# Patient Record
Sex: MALE | Race: WHITE | NOT HISPANIC OR LATINO | Employment: FULL TIME | URBAN - METROPOLITAN AREA
[De-identification: names, ages, dates, MRNs, and addresses within clinical notes are randomized per-mention and may not be internally consistent; named-entity substitution may affect disease eponyms.]

---

## 2017-03-07 ENCOUNTER — OFFICE VISIT - HEALTHEAST (OUTPATIENT)
Dept: FAMILY MEDICINE | Facility: CLINIC | Age: 32
End: 2017-03-07

## 2017-03-07 DIAGNOSIS — F17.200 TOBACCO USE DISORDER: ICD-10-CM

## 2017-03-07 DIAGNOSIS — Z71.85 IMMUNIZATION COUNSELING: ICD-10-CM

## 2017-04-28 ENCOUNTER — COMMUNICATION - HEALTHEAST (OUTPATIENT)
Dept: HEALTH INFORMATION MANAGEMENT | Facility: CLINIC | Age: 32
End: 2017-04-28

## 2019-09-25 ENCOUNTER — OFFICE VISIT - HEALTHEAST (OUTPATIENT)
Dept: FAMILY MEDICINE | Facility: CLINIC | Age: 34
End: 2019-09-25

## 2019-09-25 DIAGNOSIS — M54.16 LUMBAR BACK PAIN WITH RADICULOPATHY AFFECTING RIGHT LOWER EXTREMITY: ICD-10-CM

## 2019-09-25 ASSESSMENT — MIFFLIN-ST. JEOR: SCORE: 2028.51

## 2019-09-26 ENCOUNTER — HOSPITAL ENCOUNTER (OUTPATIENT)
Dept: PHYSICAL MEDICINE AND REHAB | Facility: CLINIC | Age: 34
Discharge: HOME | End: 2019-09-26
Attending: NURSE PRACTITIONER

## 2019-09-26 DIAGNOSIS — M54.41 ACUTE BILATERAL LOW BACK PAIN WITH RIGHT-SIDED SCIATICA: ICD-10-CM
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
## 2019-09-26 DIAGNOSIS — R20.2 PARESTHESIA OF RIGHT LOWER EXTREMITY: ICD-10-CM

## 2019-09-26 RX ORDER — GABAPENTIN 300 MG/1
300 CAPSULE ORAL 3 TIMES DAILY
Qty: 90 CAPSULE | Refills: 3 | Status: SHIPPED | OUTPATIENT
Start: 2019-09-26 | End: 2021-10-13

## 2019-09-26 ASSESSMENT — MIFFLIN-ST. JEOR: SCORE: 2027.15

## 2019-10-09 ENCOUNTER — OFFICE VISIT - HEALTHEAST (OUTPATIENT)
Dept: PHYSICAL THERAPY | Facility: REHABILITATION | Age: 34
End: 2019-10-09

## 2019-10-09 DIAGNOSIS — M54.16 LUMBAR RADICULITIS: ICD-10-CM

## 2019-10-09 DIAGNOSIS — M54.50 ACUTE BILATERAL LOW BACK PAIN WITHOUT SCIATICA: ICD-10-CM

## 2019-10-09 DIAGNOSIS — M62.81 MUSCLE WEAKNESS (GENERALIZED): ICD-10-CM

## 2019-10-22 ENCOUNTER — OFFICE VISIT - HEALTHEAST (OUTPATIENT)
Dept: PHYSICAL THERAPY | Facility: REHABILITATION | Age: 34
End: 2019-10-22

## 2019-10-22 DIAGNOSIS — M54.50 ACUTE BILATERAL LOW BACK PAIN WITHOUT SCIATICA: ICD-10-CM

## 2019-10-22 DIAGNOSIS — M62.81 MUSCLE WEAKNESS (GENERALIZED): ICD-10-CM

## 2019-10-22 DIAGNOSIS — M54.16 LUMBAR RADICULITIS: ICD-10-CM

## 2019-10-24 ENCOUNTER — OFFICE VISIT - HEALTHEAST (OUTPATIENT)
Dept: PHYSICAL THERAPY | Facility: REHABILITATION | Age: 34
End: 2019-10-24

## 2019-10-24 DIAGNOSIS — M62.81 MUSCLE WEAKNESS (GENERALIZED): ICD-10-CM

## 2019-10-24 DIAGNOSIS — M54.50 ACUTE BILATERAL LOW BACK PAIN WITHOUT SCIATICA: ICD-10-CM

## 2019-10-24 DIAGNOSIS — M54.16 LUMBAR RADICULITIS: ICD-10-CM

## 2019-11-05 ENCOUNTER — COMMUNICATION - HEALTHEAST (OUTPATIENT)
Dept: PHYSICAL THERAPY | Facility: REHABILITATION | Age: 34
End: 2019-11-05

## 2019-11-07 ENCOUNTER — OFFICE VISIT - HEALTHEAST (OUTPATIENT)
Dept: PHYSICAL THERAPY | Facility: REHABILITATION | Age: 34
End: 2019-11-07

## 2019-11-07 DIAGNOSIS — M54.16 LUMBAR RADICULITIS: ICD-10-CM

## 2019-11-07 DIAGNOSIS — M62.81 MUSCLE WEAKNESS (GENERALIZED): ICD-10-CM

## 2019-11-07 DIAGNOSIS — M54.50 ACUTE BILATERAL LOW BACK PAIN WITHOUT SCIATICA: ICD-10-CM

## 2019-11-14 ENCOUNTER — OFFICE VISIT - HEALTHEAST (OUTPATIENT)
Dept: PHYSICAL THERAPY | Facility: REHABILITATION | Age: 34
End: 2019-11-14

## 2019-11-14 DIAGNOSIS — M54.50 ACUTE BILATERAL LOW BACK PAIN WITHOUT SCIATICA: ICD-10-CM

## 2019-11-14 DIAGNOSIS — M62.81 MUSCLE WEAKNESS (GENERALIZED): ICD-10-CM

## 2019-11-14 DIAGNOSIS — M54.16 LUMBAR RADICULITIS: ICD-10-CM

## 2020-09-22 ENCOUNTER — AMBULATORY - HEALTHEAST (OUTPATIENT)
Dept: FAMILY MEDICINE | Facility: CLINIC | Age: 35
End: 2020-09-22

## 2020-09-22 ENCOUNTER — VIRTUAL VISIT (OUTPATIENT)
Dept: FAMILY MEDICINE | Facility: OTHER | Age: 35
End: 2020-09-22
Payer: COMMERCIAL

## 2020-09-22 DIAGNOSIS — Z20.822 SUSPECTED COVID-19 VIRUS INFECTION: ICD-10-CM

## 2020-09-22 PROCEDURE — 99421 OL DIG E/M SVC 5-10 MIN: CPT | Performed by: PHYSICIAN ASSISTANT

## 2020-09-22 NOTE — PROGRESS NOTES
"Date: 2020 12:36:48  Clinician: Ge Duncan  Clinician NPI: [id number]  Patient: Jose Manuel Santacruz  Patient : 1985  Patient Address: [facility] [address] [place] [id number]  Patient Phone: [phone]  Visit Protocol: URI  Patient Summary:  Jose Manuel is a 35 year old ( : 1985 ) male who initiated a [facility] Visit for COVID-19 (Coronavirus) evaluation and screening. When asked the question \"Please sign me up to receive news, health information and promotions from [facility].\", Jose Manuel responded \"No\".    Jose Manuel states his symptoms started gradually 3-4 days ago. After his symptoms started, they improved and then got worse again.   His symptoms consist of malaise, facial pain or pressure, a sore throat, diarrhea, a cough, nasal congestion, a headache, and myalgia. Jose Manuel also feels feverish.   Symptom details     Nasal secretions: The color of his mucus is yellow.    Cough: Jose Manuel coughs a few times an hour and his cough is not more bothersome at night. Phlegm comes into his throat when he coughs. He believes his cough is caused by post-nasal drip. The color of the phlegm is clear.     Sore throat: Jose Manuel reports having mild throat pain (1-3 on a 10 point pain scale), does not have exudate on his tonsils, and can swallow liquids. He is not sure if the lymph nodes in his neck are enlarged. A rash has not appeared on the skin since the sore throat started.     Temperature: His current temperature is 98.9 degrees Fahrenheit.     Facial pain or pressure: The facial pain or pressure does not feel worse when bending or leaning forward.     Headache: He states the headache is moderate (4-6 on a 10 point pain scale).      Jose Manuel denies having chills, teeth pain, ageusia, ear pain, anosmia, vomiting, rhinitis, nausea, and wheezing. He also denies taking antibiotic medication in the past month and having recent facial or sinus surgery in the past 60 days. He is not experiencing dyspnea.   Precipitating events  Jose Manuel is " not sure if he has been exposed to someone with strep throat. He has not recently been exposed to someone with influenza. Jose Manuel has been in close contact with the following high risk individuals: children under the age of 5 and adults 65 or older.   Pertinent COVID-19 (Coronavirus) information  In the past 14 days, Jose Manuel has not worked in a congregate living setting.   He does not work or volunteer as healthcare worker or a  and does not work or volunteer in a healthcare facility.   Jose Manuel also has not lived in a congregate living setting in the past 14 days. He does not live with a healthcare worker.   Jose Manuel has not had a close contact with a laboratory-confirmed COVID-19 patient within 14 days of symptom onset.   Since December 2019, Jose Manuel and has not had upper respiratory infection or influenza-like illness. Has not been diagnosed with lab-confirmed COVID-19 test   Pertinent medical history  Jose Manuel had 1 sinus infection within the past year.   Jose Manuel needs a return to work/school note.   Weight: 215 lbs   Jose Manuel does not smoke or use smokeless tobacco.   Weight: 215 lbs    MEDICATIONS: ibuprofen oral, ALLERGIES: Penicillins  Clinician Response:  Dear Jose Manuel,   Your symptoms show that you may have coronavirus (COVID-19). This illness can cause fever, cough and trouble breathing. Many people get a mild case and get better on their own. Some people can get very sick.  What should I do?  We would like to test you for this virus.   1. Please call [phone] to schedule your visit. Explain that you were referred by [facility] to have a COVID-19 test. Be ready to share your [facility] visit ID number.  The following will serve as your written order for this COVID Test, ordered by me, for the indication of suspected COVID [Z20.828]: The test will be ordered in [employer], our electronic health record, after you are scheduled. It will show as ordered and authorized by Mikhail Lewis MD.  Order: COVID-19 (Coronavirus)  "PCR for SYMPTOMATIC testing from [facility].      2. When it's time for your COVID test:  Stay at least 6 feet away from others. (If someone will drive you to your test, stay in the backseat, as far away from the  as you can.)   Cover your mouth and nose with a mask, tissue or washcloth.  Go straight to the testing site. Don't make any stops on the way there or back.      3.Starting now: Stay home and away from others (self-isolate) until:   You've had no fever---and no medicine that reduces fever---for one full day (24 hours). And...   Your other symptoms have gotten better. For example, your cough or breathing has improved. And...   At least 10 days have passed since your symptoms started.       During this time, don't leave the house except for testing or medical care.   Stay in your own room, even for meals. Use your own bathroom if you can.   Stay away from others in your home. No hugging, kissing or shaking hands. No visitors.  Don't go to work, school or anywhere else.    Clean \"high touch\" surfaces often (doorknobs, counters, handles, etc.). Use a household cleaning spray or wipes. You'll find a full list of  on the EPA website: www.epa.gov/pesticide-registration/list-n-disinfectants-use-against-sars-cov-2.   Cover your mouth and nose with a mask, tissue or washcloth to avoid spreading germs.  Wash your hands and face often. Use soap and water.  Caregivers in these groups are at risk for severe illness due to COVID-19:  o People 65 years and older  o People who live in a nursing home or long-term care facility  o People with chronic disease (lung, heart, cancer, diabetes, kidney, liver, immunologic)  o People who have a weakened immune system, including those who:   Are in cancer treatment  Take medicine that weakens the immune system, such as corticosteroids  Had a bone marrow or organ transplant  Have an immune deficiency  Have poorly controlled HIV or AIDS  Are obese (body mass index of 40 or " higher)  Smoke regularly   o Caregivers should wear gloves while washing dishes, handling laundry and cleaning bedrooms and bathrooms.  o Use caution when washing and drying laundry: Don't shake dirty laundry, and use the warmest water setting that you can.  o For more tips, go to www.cdc.gov/coronavirus/2019-ncov/downloads/10Things.pdf.    How can I take care of myself?    Get lots of rest. Drink extra fluids (unless a doctor has told you not to).   Take Tylenol (acetaminophen) for fever or pain. If you have liver or kidney problems, ask your family doctor if it's okay to take Tylenol.   Adults can take either:    650 mg (two 325 mg pills) every 4 to 6 hours, or...   1,000 mg (two 500 mg pills) every 8 hours as needed.    Note: Don't take more than 3,000 mg in one day. Acetaminophen is found in many medicines (both prescribed and over-the-counter medicines). Read all labels to be sure you don't take too much.   For children, check the Tylenol bottle for the right dose. The dose is based on the child's age or weight.    If you have other health problems (like cancer, heart failure, an organ transplant or severe kidney disease): Call your specialty clinic if you don't feel better in the next 2 days.       Know when to call 911. Emergency warning signs include:    Trouble breathing or shortness of breath Pain or pressure in the chest that doesn't go away Feeling confused like you haven't felt before, or not being able to wake up Bluish-colored lips or face.  Where can I get more information?    [employer] -- About COVID-19: [email or web address]   CDC -- What to Do If You're Sick: www.cdc.gov/coronavirus/2019-ncov/about/steps-when-sick.html   CDC -- Ending Home Isolation: www.cdc.gov/coronavirus/2019-ncov/hcp/disposition-in-home-patients.html   CDC -- Caring for Someone: www.cdc.gov/coronavirus/2019-ncov/if-you-are-sick/care-for-someone.html   [facility] -- Interim Guidance for Hospital Discharge to Home:  www.health.[facility].mn.us/diseases/coronavirus/hcp/hospdischarge.pdf   [facility] clinical trials (COVID-19 research studies): [email or web address]/umn-[email or web address]-trials    Below are the COVID-19 hotlines at the [facility] ([facility]). Interpreters are available.    For health questions: Call 663-568-2959 or 1-189.912.3894 (7 a.m. to 7 p.m.) For questions about schools and childcare: Call 225-955-0575 or 1-664.669.6472 (7 a.m. to 7 p.m.)    Diagnosis: Cough  Diagnosis ICD: R05

## 2020-09-24 ENCOUNTER — COMMUNICATION - HEALTHEAST (OUTPATIENT)
Dept: SCHEDULING | Facility: CLINIC | Age: 35
End: 2020-09-24

## 2021-05-30 VITALS — WEIGHT: 217 LBS | BODY MASS INDEX: 27.12 KG/M2

## 2021-06-01 NOTE — PATIENT INSTRUCTIONS - HE
[facility] Radiology Locations    Please call [phone] to schedule your image(s) (select option #1 and then #2). There are 3 different locations, see below. You can do walk-in visits for xray only images if you want.     [facility]  [address] [address]    [facility]   [address] [address]    [facility]  [address] [address]    Make sure you have your work comp claim information prior to doing your xray.     Discussed the importance of core strengthening, ROM, stretching exercises with the patient and how each of these entities is important in decreasing pain.  Explained to the patient that the purpose of physical therapy is to teach the patient a home exercise program.  These exercises need to be performed every day in order to decrease pain and prevent future occurrences of pain.        ~Please call Nurse Navigation line [phone] with any questions or concerns about your treatment plan, if symptoms worsen and you would like to be seen urgently, or if you have problems controlling bladder and bowel function.  ~Follow Up Appointment time slots with Heavenly Barrett, CNP with the [facility], are also available at the [facility] location near [facility] on the first and third THURSDAY afternoons of each month.    Prescribed Gabapentin today, 300 mg tablets, to be titrated up to 3 tablets 3 times a day as tolerated for your nerve pain. Please follow Gabapentin dosing chart below.  Gabapentin 300mg Dosing Chart  DATE  MORNING AFTERNOON BEDTIME    Day 1 0 0 1    Day 2 0 0 1    Day 3 0 0 1    Day 4 1 0 1    Day 5 1 0 1    Day 6 1 0 1    Day 7 1 1 1    Day 8 1 1 1    Day 9 1 1 1    Day 10 1 1 2    Day 11 1 1 2    Day 12 1 1 2    Day 13 2 1 2    Day 14 2 1 2    Day 15 2 1 2    Day 16 2 2 2    Day 17 2 2 2    Day 18 2 2 2    Day 19 2 2 3    Day 20 2 2 3    Day 21 2 2 3    Day 22 3 2 3    Day 23 3 2 3    Day 24 3  2 3    Day 25 3 3 3    Day 26 3 3 3    Day 27 3 3 3     Continue medication, taking 3 capsules three times daily  Please call if you have any questions regarding how to take your medication

## 2021-06-01 NOTE — PROGRESS NOTES
ASSESSMENT: Jose Manuel Santacruz is a 34 y.o. male who presents for consultation at the request of HE PCP Steven Wallace MD, with a past medical history significant for joint pain, infertility, hernia surgery who presents today for new patient evaluation of:    -Acute bilateral low back pain right greater than left lumbosacral junction with paresthesias/tightness into the right buttock/posterior thigh with mild paresthesias into the right groin.  Patient does have increased pain with forward flexion as well as positive straight leg raise on exam on the right to back and leg symptoms consistent with radiculitis likely related to disc bulge from work twisting/pulling injury 8/29/2019.    Patient is neurologically intact on exam. No myelopathic or red flag symptoms.     WALTER Score: 30    WHO 5: 16     Diagnoses and all orders for this visit:    Acute bilateral low back pain with right-sided sciatica  -     XR Lumbar Spine 2 or 3 VWS; Future; Expected date: 09/26/2019  -     Ambulatory referral to PT/OT  -     gabapentin (NEURONTIN) 300 MG capsule; Take 1 capsule (300 mg total) by mouth 3 (three) times a day. Follow Gabapentin Dosing chart given  Dispense: 90 capsule; Refill: 3    Paresthesia of right lower extremity  -     XR Lumbar Spine 2 or 3 VWS; Future; Expected date: 09/26/2019  -     Ambulatory referral to PT/OT  -     gabapentin (NEURONTIN) 300 MG capsule; Take 1 capsule (300 mg total) by mouth 3 (three) times a day. Follow Gabapentin Dosing chart given  Dispense: 90 capsule; Refill: 3    Work related injury  -     XR Lumbar Spine 2 or 3 VWS; Future; Expected date: 09/26/2019  -     Ambulatory referral to PT/OT      PLAN:  Reviewed spine anatomy and disease process. Discussed diagnosis and treatment options with the patient today. A shared decision making model was used.  The patient's values and choices were respected. The following represents what was discussed and decided upon by the provider and the patient.       -DIAGNOSTIC TESTS:    --Ordered lumbar spine x-ray to further evaluate.  --Discussed with patient that if he does not get relief with PT and pending x-ray we may consider lumbar spine MRI however he is neurologically intact on exam today therefore will hold off on advanced imaging.    -PHYSICAL THERAPY: Referral to physical therapy placed to [facility] as well to establish home exercises for core strengthening and nerve glide exercises.  Discussed the importance of core strengthening, ROM, stretching exercises with the patient and how each of these entities is important in decreasing pain.  Explained to the patient that the purpose of physical therapy is to teach the patient a home exercise program.  These exercises need to be performed every day in order to decrease pain and prevent future occurrences of pain.        -MEDICATIONS: Prescribed gabapentin 300 mg to titrate up to 1 tablet 3 times daily, discussed with patient if he gets no benefit tolerates this dose we could go to a higher dose as well up to 3 tablets 3 times daily.  -I did advise patient to continue and complete Medrol Dosepak.  Discussed multiple medication options today with patient. Discussed risks, side effects, and proper use of medications. Patient verbalized understanding.    -Workability: Did provide patient with work restrictions today 10 pound lifting limit, limiting bending and twisting for 3 weeks.  Discussed with patient if he is doing well at that time we can lift restrictions or light in his restrictions.  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    -INTERVENTIONS: No recommendations for injections at this time, if symptoms are not improving with PT pending MRI review injections could be considered.  Discussed risks and benefits of injections with patient today.    -PATIENT EDUCATION:  45 minutes of total visit time was spent face to face with the patient today,  greater than 50% of total time spent with patient was spent on counseling, education, and coordinating care.   -10 minutes spent outside of visit time, non-face-to-face time, reviewing chart.    -FOLLOW-UP:   Follow-up if symptoms are not improving with physical therapy 3 to 4 weeks or sooner pain is worsening or new symptoms arise.    Advised patient to call the [facility] if symptoms worsen or you have problems controlling bladder and bowel function.   ______________________________________________________________________    SUBJECTIVE:  HPI:  Jose Manuel Santacruz  Is a 34 y.o. male who presents today for new patient evaluation of low back pain low back pain at the lumbosacral junction that is predominant on the right with numbness and tingling and tightness that radiates into the right posterior buttock posterior thigh and mild symptoms into the right anterior groin that is been ongoing since work injury 8/29/2019, somewhat intermittent however more significant and constant since 9/24/2019 where patient did try to work again but did not tolerate working and his pain flared.  Currently he does report that his pain is most bothersome with sitting for long periods of time but also any type of bending or twisting movements even bending down to put his socks on has been challenging.  He does report that the first step after sitting for some time is also slight shaky but he denies any lower extremity weakness.  Patient otherwise denies any lower extremity pain, denies weakness lower extremities, denies recent trips or falls, denies balance changes, denies bowel or bladder loss control, denies saddle anesthesia.    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  No prior history of ongoing back issues.    -Treatment to Date:  No prior spinal surgery or spinal injection.  No prior physical therapy treatments.  Chiropractic treatments x3 with short-term relief.    -Medications:  Medrol Dosepak prescribed 9/25/2019, slight improvement so far.  BenGay topical with minimal benefit  Ibuprofen with minimal benefit.    Current Outpatient Medications on File Prior to Encounter   Medication Sig Dispense Refill     MENTHOL (BENGAY TOP) Ultra Strength 4-10-30 % External Cream       predniSONE (DELTASONE) 20 MG tablet Take 20 mg by mouth 2 (two) times a day for 5 days. 10 tablet 0     ibuprofen 200 mg cap Take 2 capsules by mouth every 6 (six) hours as needed.       No current facility-administered medications on file prior to encounter.        Allergies   Allergen Reactions     Penicillins        No past medical history on file.     Patient Active Problem List   Diagnosis     Joint Pain, Localized In The Right Shoulder     Infertility       Past Surgical History:   Procedure Laterality Date     HERNIA REPAIR      R and L inguinal       Family History   Problem Relation Age of Onset     No Medical Problems Mother      No Medical Problems Father        Reviewed past medical, surgical, and family history with patient found on new patient intake packet located in EMR Media tab.     SOCIAL HX: Patient works as a  at a sign face production company.  Patient is .  Patient denies smoking/tobacco use, does drink 1 beer every couple of weeks however denies history being a heavy drinker, denies recreational drug use.    ROS: Positive for muscle pain, muscle fatigue.  Specifically negative for bowel/bladder dysfunction, balance changes, headache, dizziness, foot drop, fevers, chills, appetite changes, nausea/vomiting, unexplained weight loss. Otherwise 13 systems reviewed are negative. Please see the patient's intake questionnaire from today for details.    OBJECTIVE:  /82 (Patient Site: Right Arm, Patient Position: Sitting)    "Ht 6' 3\" (1.905 m)   Wt (!) 223 lb (101.2 kg)   BMI 27.87 kg/m      PHYSICAL EXAMINATION:    --CONSTITUTIONAL:  Vital signs as above.  No acute distress.  The patient is well nourished and well groomed.  --PSYCHIATRIC:  Appropriate mood and affect. The patient is awake, alert, oriented to person, place, time and answering questions appropriately with clear speech.    --SKIN:  Skin over the face, bilateral lower extremities, and posterior torso is clean, dry, intact without rashes.    --RESPIRATORY: Normal rhythm and effort. No abnormal accessory muscle breathing patterns noted.   --ABDOMINAL:  Soft, non-distended, non-tender throughout all quadrants.   --STANDING EXAMINATION:  Normal lumbar lordosis noted, no lateral shift.  --MUSCULOSKELETAL: Lumbar spine inspection reveals no evidence of deformity. Range of motion is not limited in lumbar extension, lateral rotation, limitations with forward flexion due to pain. No tenderness to palpation lumbar spine. Straight leg raising in the seated position is negative to radicular pain on the left, positive to right low back pain and right posterior thigh discomfort on the right. Sciatic notch non-tender.  --SACROILIAC JOINT: One Finger point test negative.  --GROSS MOTOR: Gait is non-antalgic. Easily arises from a seated position.   --LOWER EXTREMITY MOTOR TESTING:  Plantar flexion left 5/5, right 5/5   Dorsiflexion left 5/5, right 5/5   Great toe MTP extension left 5/5, right 5/5  Knee flexion left 5/5, right 5/5  Knee extension left 5/5, right 5/5   Hip flexion left 5/5, right 5/5  Hip abduction left 5/5, right 5/5  Hip adduction left 5/5, right 5/5   --HIPS: Full range of motion bilaterally. Negative FABERs on the involved lower extremity.   --NEUROLOGICAL:  1/4 patellar, medial hamstring, and achilles reflexes bilaterally.  Sensation to light touch is intact in the bilateral L4, L5, and S1 dermatomes. Babinski is negative. No clonus.  Negative Shyann reflex " bilaterally.  --VASCULAR:  2/4 dorsalis pedis and posterior tibialsi pulses bilaterally.  Bilateral lower extremities are warm.  There is no pitting edema of the bilateral lower extremities.    RESULTS: Prior medical records from [facility] 3/7/2017 to current and care everywhere were reviewed today.    Imaging: No results found.

## 2021-06-01 NOTE — PROGRESS NOTES
Assessment/Plan:    1. Lumbar back pain with radiculopathy affecting right lower extremity  We discussed obtaining spine x-ray today but patient declines.  I recommended physical therapy.  Referral placed today.  Also provide short course of prednisone, 20 mg twice daily for 5 days.  Will place referral to spine care in the event of any persisting or worsening symptoms despite physical therapy.  We discussed red flag symptoms of back pain that would warrant immediate evaluation.  He is comfortable with this plan.  - predniSONE (DELTASONE) 20 MG tablet; Take 20 mg by mouth 2 (two) times a day for 5 days.  Dispense: 10 tablet; Refill: 0  - Ambulatory referral to PT/OT  - Ambulatory referral to Spine Care      Subjective:    Jose Manuel Santacruz is a 34-year-old male seen today for evaluation of lower back pain.  Patient states that pain started about a month ago.  He recalls injuring it at work.  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  Does a lot of heavy lifting and pulling for his job.  He recalls moving a large steel pole, using a twisting motion.  Pain started shortly after that.  This was on August 29, 2019.  Pain is intermittent but seems to be happening more frequently lately.  Some days are better than others.  He finds the pain is manageable with over-the-counter medications at times and other days he has some difficulty with bending and walking.  Getting up from a seated position is more difficult and exacerbates the pain.  He has been using ibuprofen and BenGay which helps take the edge off.  Yesterday, symptoms became more bothersome.  He occasionally gets a tingly sensation down his right buttocks.  He has seen a chiropractor 3 times since the injury.  Has had adjustments which helped in the short-term.  He has another chiropractic appointment tomorrow.  He is wondering about possible next steps from here.  Denies any history of back injuries or surgeries prior to this.  No loss of bowel or bladder control. Review  "of systems is as stated in HPI, and the remainder of the 10 system review is otherwise unremarkable.    Past Medical History, Family History, and Social History reviewed.    Past Surgical History:   Procedure Laterality Date     HERNIA REPAIR      R and L inguinal        Family History   Problem Relation Age of Onset     No Medical Problems Mother      No Medical Problems Father         History reviewed. No pertinent past medical history.     Social History     Tobacco Use     Smoking status: Current Every Day Smoker     Packs/day: 0.50     Smokeless tobacco: Never Used   Substance Use Topics     Alcohol use: Yes     Drug use: No        Current Outpatient Medications   Medication Sig Dispense Refill     ibuprofen 200 mg cap Take 2 capsules by mouth every 6 (six) hours as needed.       MENTHOL (BENGAY TOP) Ultra Strength 4-10-30 % External Cream       buPROPion (WELLBUTRIN SR) 150 MG 12 hr tablet Take 1 tablet (150 mg total) by mouth 2 (two) times a day. 60 tablet 2     predniSONE (DELTASONE) 20 MG tablet Take 20 mg by mouth 2 (two) times a day for 5 days. 10 tablet 0     No current facility-administered medications for this visit.           Objective:    Vitals:    09/25/19 0811   BP: 120/80   Patient Site: Right Arm   Patient Position: Sitting   Cuff Size: Adult Regular   Pulse: 66   Weight: (!) 223 lb 4.8 oz (101.3 kg)   Height: 6' 3\" (1.905 m)      Body mass index is 27.91 kg/m .      General Appearance:    Alert, cooperative, no distress, appears stated age.     Back:     Symmetric, no curvature, ROM normal, no CVA tenderness, no point tenderness noted along spine.  Positive straight leg raise while seated.   Lungs:     Clear to auscultation bilaterally, respirations unlabored   Heart:    Regular rate and rhythm, S1 and S2 normal, no murmur, rub   or gallop   Extremities:   Extremities normal, atraumatic, no cyanosis or edema   Pulses:   2+ and symmetric all extremities   Skin:   Skin color, texture, turgor " normal, no rashes or lesions   Neurologic:   CNII-XII intact. Normal strength, sensation and reflexes       throughout       This note has been dictated using voice recognition software. Any grammatical or context distortions are unintentional and inherent to the use of this software.

## 2021-06-02 NOTE — PROGRESS NOTES
[facility]   [facility] Initial Evaluation    Patient Name: Jose Manuel Santacruz  Date of evaluation: 10/9/2019  Referral Diagnosis: Lumbar back pain with radiculopathy affecting right lower extremity  Referring provider: Renetta Alston CNP  Visit Diagnosis:     ICD-10-CM    1. Acute bilateral low back pain without sciatica M54.5    2. Lumbar radiculitis M54.16    3. Muscle weakness (generalized) M62.81        Assessment:      Pt presents with acute bilateral low back pain with minimal lumbar radiculitis on the R and L after performing a lifting and twisting task at work.  Pt has limited lumbar flexion>extension with increased pain and mild radicular sx.  Pt has decreased core and lumbar strength.  Pt with hamstring tightness, mild + slump B and decreased posture and mobility awareness.  Pt will benefit from skilled PT to improve overall body and lifting mechanics and decrease is low back pain and sx.     Pt. is appropriate for skilled PT intervention as outlined in the Plan of Care (POC).    Goals:  Pt. will demonstrate/verbalize independence in self-management of condition in : 6 weeks    Patient will decrease : WALTER score;for improved quality of function;in 6 weeks  Pt will: be able to perform dressing tasks for his socks, pants, etc with increased ease and pain <2/10; in 6 weeks   Pt will: be able to perform work tasks with increased body mechanics and LBP <2/10; in 6 weeks       Patient's expectations/goals are realistic.    Barriers to Learning or Achieving Goals:  No Barriers.       Plan / Patient Instructions:        Plan of Care:   Authorization / Certification Number of Visits: No visit limit per ICT  Communication with: Referral Source;Patient Caregiver;Employer  Patient Related Instruction: Nature of Condition;Treatment plan and rationale;Self Care instruction;Basis of treatment;Body mechanics;Posture;Expected outcome  Times per Week: 2  Number of Weeks: 6  Number of Visits: 12 - PRN   Discharge  Planning: Pt will be discharged upon meeting goals or plateau of progress   Therapeutic Exercise: ROM;Stretching;Strengthening  Neuromuscular Reeducation: posture;kinesio tape;core  Manual Therapy: soft tissue mobilization;myofascial release;joint mobilization;muscle energy;strain counterstrain  Modalities: electrical stimulation;TENS;traction;cold pack;hot pack      Plan for next visit: Focus on lumbar mobility, core and lumbar strengthening with ther-ex and continue with estim or MT PRN      Subjective:         History of Present Illness:    Jose Manuel is a 34 y.o. male who presents to therapy today with complaints of low back pain. Date of onset/duration of symptoms is 2019. Onset was sudden. Symptoms are not improving. He denies history of similar symptoms. He describes their previous level of function as not limited    Pt was doing a pulling/twisting motion for work tasks and he did not feel pain immediately but shortly after he started feeling pain in any position he moved in.  This was to move a large roll of product that was heavy and onto a pull.  Pain persisted for several days and then he mentioned it to his employer.     Over the month, he was seeing a chiropractor for treatment.     Then 19 woke up with intense pain.  Perhaps did too much lifting the day before at work.     Pt has not had imaging.  He has done a prednisone 5 days treatment and that helped some.  He is also taking gabapentin with 2 pills in the AM and 1 in the PM.   He does not like taking gabapentin.     Pt has looked online for PT stretches. - self traction - but he doesn't have anything to hang on to. Prone extension, wall shifts     Pt reports that his sx are pain across the low back with some tightness and tingling wrapping into the right hip and down to the back of the thigh and then minimal L hip pain.  Pt also reports that in the past week he has also had some more pain in his thoracolumbar muscles.     Pain Ratin  Pain  rating at best: 4  Pain rating at worst: 9  Pain description: aching, pain, sharp and tingling    Functional limitations are described as occurring with:   bending  personal cares dressing lower body and donning shoes and socks  performing routine daily activities  sitting too long   work tasks including bending, twisting, lifting, etc.     Patient reports benefit from:  anti-inflammatory, cold, CBD oil          Objective:      Note: Items left blank indicates the item was not performed or not indicated at the time of the evaluation.    Patient Outcome Measures :    Modified Oswestry Low Back Pain Disablity Questionnaire  in %: 38     Scores range from 0-100%, where a score of 0% represents minimal pain and maximal function. The minimal clinically important difference is a score reduction of 12%.    Examination  1. Acute bilateral low back pain without sciatica     2. Lumbar radiculitis     3. Muscle weakness (generalized)       Involved side: Right and Bilateral  Posture Observation:      General sitting posture is  fair.    Lumbar ROM:    Date: 10/9/19      *Indicate scale AROM AROM AROM   Lumbar Flexion Mod loss, LBP, gowers sign to get up     Lumbar Extension Min loss, tight, feels ok      Right Left Right Left Right Left   Lumbar Sidebending No loss, no p No loss, no        Lumbar Rotation No loss, no pain  No loss, no pain        Thoracic Flexion      Thoracic Extension      Thoracic Sidebending         Thoracic Rotation           Lower Extremity Strength:     Date: 10/9/19     LE strength/5 Right Left Right Left Right Left   Hip Flexion (L1-3) 5 5       Hip Extension (L5-S1) 4 4       Hip Abduction (L4-5) 4+ 4+       Hip Adduction (L2-3) 5 5       Hip External Rotation 5 5       Hip Internal Rotation 5 5       Knee Extension (L3-4) 5 5       Knee Flexion 5 5       Ankle Dorsiflexion (L4-5) 5 5       Great Toe Extension (L5)         Ankle Plantar flexion (S1)         Abdominals Decreased strength and awareness         Sensation: WNL        Reflex Testing: NT    Palpation:  Increased tenderness over:  L3, L4,.L5  Lumbar paraspinals = min-mod     Lumbar Special Tests:     Lumbar Special Tests Right Left SI Tests Right  Left   Quadrant test   SI Compression - -   Straight leg raise -  H/S tight  Min +  H/S tight  SI Distraction - -   Crossover response - - POSH Test - -   Slump + + Sacral Thrust - -   Sit-up test  FADIR - -   Trunk extensor endurance test  Resisted Abduction     Prone instability test  Other:     Pubic shotgun  Other:       Repeated Motion Testing:  Peripheralizes with Flexion    Passive Mobility - Joint Integrity:  WNL   Pain over L3-L5     LE Screen:  bilateral hamstring tightness     Treatment Today     TREATMENT MINUTES COMMENTS   Evaluation 25    Self-care/ Home management     Manual therapy     Neuromuscular Re-education     Therapeutic Activity     Therapeutic Exercises 20 Pathology, POC, HEP, etc.   See flow sheet    Gait training     Modality: estim  10  (13) Bilateral lumbar IFC - high/sweep - supine 90/90               Total 58    Blank areas are intentional and mean the treatment did not include these items.     PT Evaluation Code: (Please list factors)  Patient History/Comorbidities:   Patient Active Problem List   Diagnosis     Joint Pain, Localized In The Right Shoulder     Infertility     Examination: pain with lumbar ROM, core and lumbar weakness, tender over lumbar spine and muscles, poor posture  Clinical Presentation: stable and uncomplicated   Clinical Decision Making: low    Patient History/  Comorbidities Examination  (body structures and functions, activity limitations, and/or participation restrictions) Clinical Presentation Clinical Decision Making (Complexity)   No documented Comorbidities or personal factors 1-2 Elements Stable and/or uncomplicated Low   1-2 documented comorbidities or personal factor 3 Elements Evolving clinical presentation with changing characteristics Moderate    3-4 documented comorbidities or personal factors 4 or more Unstable and unpredictable High            Maira Gaona  10/9/2019  8:10 AM

## 2021-06-02 NOTE — PROGRESS NOTES
"[facility] Daily Progress     Patient Name: Jose Manuel Santacruz  Date: 10/24/2019  Visit #: 3  PTA #1  Referring provider: Steven Wallace MD  Visit Diagnosis:     ICD-10-CM    1. Acute bilateral low back pain without sciatica M54.5    2. Lumbar radiculitis M54.16    3. Muscle weakness (generalized) M62.81          Assessment:     Pt returns to PT for follow-up with lessening bilateral low back pain with minimal lumbar radiculitis on the R and L after performing a lifting and twisting task at work.  Pt has slightly improved lumbar flexion>extension with increased pain and mild radicular sx.  Pt has decreased core and lumbar strength.  Pt with hamstring tightness, mild + slump B and decreased posture and mobility awareness.  Pt will benefit from skilled PT to improve overall body and lifting mechanics and decrease is low back pain and sx.     Pt continues to report relief with the estim.   Pt has been compliant with his HEP.     HEP/POC compliance is  good .  Patient demonstrates understanding/independence with home program.  Patient is benefitting from skilled physical therapy and is making steady progress toward functional goals.    Goal Status: Om going  Pt. will demonstrate/verbalize independence in self-management of condition in : 6 weeks    Patient will decrease : WALTER score;for improved quality of function;in 6 weeks  Pt will: be able to perform dressing tasks for his socks, pants, etc with increased ease and pain <2/10; in 6 weeks   Pt will: be able to perform work tasks with increased body mechanics and LBP <2/10; in 6 weeks       Plan / Patient Education:     Continue with initial plan of care.  Progress with home program as tolerated.     Continue with exercises and estim       Subjective:   \"Doing alright.\" Pt reporst ts the pain is \"slight.\" Pt feels that the Gabapentin helps.  Pt continues to report good and bad days.   Pt reports he walks \" almost every day\" up to 1 mile.  Pain Rating: " "0        Objective:     Pt tolerates the exercises well with no pain and moderate cueing for technique.          Current Exercises:  Exercise #1: supine LTR  Comment #1: review   Exercise #2: SKTC + H/S stretch + nerve glide   Comment #2: Seated Bx30\" + seated Bx5 - alt   Exercise #3: Ab set   Comment #3: x5 with 5\" - cues   Exercise #4: Supine Bridge   Comment #4: Bx10 -cues to increase reps   Exercise #5: SHAMEKA + prone press-ups   Comment #5: review   Exercise #6: supine march wiht ab sets  Comment #6: x10      Treatment Today     TREATMENT MINUTES COMMENTS   Evaluation     Self-care/ Home management     Manual therapy     Neuromuscular Re-education     Therapeutic Activity     Therapeutic Exercises 15 See flow sheet   Added to HEP:  -supine march with ab sets     Gait training     Modality: estim  10  (13) Bilateral lumbar IFC - high/sweep - supine 90/90 + set-up x 3 min               Total 28    Blank areas are intentional and mean the treatment did not include these items.       Stephany Licona, PTA,CLT   10/24/2019  "

## 2021-06-02 NOTE — PROGRESS NOTES
[facility] Daily Progress     Patient Name: Jose Manuel Santacruz  Date: 10/22/2019  Visit #: 2  Referring provider: Steven Wallace MD  Visit Diagnosis:     ICD-10-CM    1. Acute bilateral low back pain without sciatica M54.5    2. Lumbar radiculitis M54.16    3. Muscle weakness (generalized) M62.81          Assessment:     Pt returns to PT for follow-up with lessening bilateral low back pain with minimal lumbar radiculitis on the R and L after performing a lifting and twisting task at work.  Pt has slightly improved lumbar flexion>extension with increased pain and mild radicular sx.  Pt has decreased core and lumbar strength.  Pt with hamstring tightness, mild + slump B and decreased posture and mobility awareness.  Pt will benefit from skilled PT to improve overall body and lifting mechanics and decrease is low back pain and sx.     PT reviewed and had pt perform exercises to review and improve technique and make progressions to continue improving.      PT also performed estim to encourage continued decrease in pain and nerve sx.  Pt notes great benefit from this treatment.     HEP/POC compliance is  good .  Patient demonstrates understanding/independence with home program.  Patient is benefitting from skilled physical therapy and is making steady progress toward functional goals.    Goal Status:  Pt. will demonstrate/verbalize independence in self-management of condition in : 6 weeks    Patient will decrease : WALTER score;for improved quality of function;in 6 weeks  Pt will: be able to perform dressing tasks for his socks, pants, etc with increased ease and pain <2/10; in 6 weeks   Pt will: be able to perform work tasks with increased body mechanics and LBP <2/10; in 6 weeks       Plan / Patient Education:     Continue with initial plan of care.  Progress with home program as tolerated.     Continue with exercises and estim   Change ab set to add marching next visit     Subjective:     Pain Ratin    Pt  "reports that he has been doing his exercises 1x per day.      Pt reports that his left LE is feeling more limber and his right LE feels stiffer.     Pt reports that he has good days and bad days with his back pain.  He can tell if he overdoes his work tasks and this increases his back pain.      He has been trying to stay consistent with taking his gabapentin.      Pt reports that he is feeling really pretty good today.  He is getting twinges of pain on his right side that occur occasionally with movement     Objective:     Pt tolerates the exercises well with no pain and moderate cueing for technique.     Bilateral hamstring tightness that impairs forward trunk flexion     Pt with core and lumbar weakness and decreased awareness as evidenced by movement and exercise performance.     Current Exercises:  Exercise #1: supine LTR  Comment #1: Bx10   Exercise #2: SKTC + H/S stretch + nerve glide   Comment #2: Bx30\" + Bx30\" Bx5   Exercise #3: Ab set   Comment #3: x5 with 5\"   Exercise #4: Supine Bridge   Comment #4: Bx5   Exercise #5: SHAMEKA + prone press-ups   Comment #5: x30\" + Bx5       Treatment Today     TREATMENT MINUTES COMMENTS   Evaluation     Self-care/ Home management     Manual therapy     Neuromuscular Re-education     Therapeutic Activity     Therapeutic Exercises 17 See flow sheet   Instruction on incorporating  abdominals into daily routine and movements    Gait training     Modality: estim  10  (13) Bilateral lumbar IFC - high/sweep - supine 90/90 + set-up x 3 min               Total 30    Blank areas are intentional and mean the treatment did not include these items.       Maira Gaona, PT   10/22/2019    "

## 2021-06-03 VITALS
BODY MASS INDEX: 27.77 KG/M2 | WEIGHT: 223.3 LBS | SYSTOLIC BLOOD PRESSURE: 120 MMHG | HEART RATE: 66 BPM | HEIGHT: 75 IN | DIASTOLIC BLOOD PRESSURE: 80 MMHG

## 2021-06-03 VITALS — BODY MASS INDEX: 27.73 KG/M2 | HEIGHT: 75 IN | WEIGHT: 223 LBS

## 2021-06-03 NOTE — PROGRESS NOTES
"[facility] Daily Progress     Patient Name: Jose Manuel Santacruz  Date: 2019  Visit #: 5  PTA #3  Referring provider: Renetta Alston CNP  Visit Diagnosis:     ICD-10-CM    1. Acute bilateral low back pain without sciatica M54.5    2. Lumbar radiculitis M54.16    3. Muscle weakness (generalized) M62.81          Assessment:     Pt returns to PT for follow-up with decreased pain over all.  Pt has not been more consistent with his HEP.  Pt with full lumbar ROM. All planes are painfree.  Pt did well with new/progressed exercises today.  Pt is making progress towards initial goals.        HEP/POC compliance is  good .  Patient demonstrates understanding/independence with home program.  Patient is benefitting from skilled physical therapy and is making steady progress toward functional goals.    Goal Status: Progressing towards  Pt. will demonstrate/verbalize independence in self-management of condition in : 6 weeks    Patient will decrease : WALTER score;for improved quality of function;in 6 weeks  Pt will: be able to perform dressing tasks for his socks, pants, etc with increased ease and pain <2/10; in 6 weeks   Pt will: be able to perform work tasks with increased body mechanics and LBP <2/10; in 6 weeks       Plan / Patient Education:     Continue with initial plan of care.  Progress with home program as tolerated.     Continue with exercises and estim   Pt will set up an appointment with Heavenly Huang NP.  Pt will schedule 1 visit with his primary PT Maira Gaona.      Subjective:   Pt reports his back is feeling good. Pt reports he \"tweaked\" his back.   Pt reports occasional L LBP.   Pt has been compliant with his HEP.  Pt is able to go from sit to stand with out pain.  Pt continues with the Gabapentin. He takes 2 in the morning.   Pt still has a 10# restriction. He would like to get that lifted.   Pt reports an 85% improvement.   Pain Ratin        Objective:     Lumbar ROM:  Flexion: minimal loss " "finger tips to above ankle  Ext: WNL  SB: WNL B  Rotation: WNL B         Current Exercises:  Exercise #1: supine LTR  Comment #1: review   Exercise #2: SKTC + H/S stretch + nerve glide   Comment #2: Seated Bx30\" + seated Bx5 - alt   Exercise #3: Ab set   Comment #3: x5 with 5\" - cues   Exercise #4:  progressed bridge to bridge with knee exetnsion  Comment #4: Bx10 -cues to increase reps   Exercise #5: SHAMEKA + prone press-ups   Comment #5: review   Exercise #6: progressed to dead bug  Comment #6: to fatique  Exercise #7: bird dog  Comment #7: x10  Exercise #8: prone and side planks  Comment #8: to fatique      Treatment Today     TREATMENT MINUTES COMMENTS   Evaluation     Self-care/ Home management     Manual therapy     Neuromuscular Re-education     Therapeutic Activity     Therapeutic Exercises 25 See flow sheet   Added to HEP:  -prone and side plank    Objective and subjective measures taken     Gait training     Modality: estim  Not today Bilateral lumbar IFC - high/sweep - supine 90/90 + set-up x 3 min               Total 28    Blank areas are intentional and mean the treatment did not include these items.       Stephany Licona, PTA,CLT   11/14/2019  "

## 2021-06-03 NOTE — PROGRESS NOTES
"[facility] Daily Progress     Patient Name: Jose Manuel Santacruz  Date: 2019  Visit #: 4  PTA #2  Referring provider: Steven Wallace MD  Visit Diagnosis:     ICD-10-CM    1. Acute bilateral low back pain without sciatica M54.5    2. Lumbar radiculitis M54.16    3. Muscle weakness (generalized) M62.81          Assessment:     Pt returns to PT for follow-up with decreased pain over all.  Pt has not been as consistent with his HEP due to a busy work schedule.  Pt with full lumbar ROM. All planes are painfree.  Pt did well with new/progressed exercises today.        HEP/POC compliance is  good .  Patient demonstrates understanding/independence with home program.  Patient is benefitting from skilled physical therapy and is making steady progress toward functional goals.    Goal Status: Progressing towards  Pt. will demonstrate/verbalize independence in self-management of condition in : 6 weeks    Patient will decrease : WALTER score;for improved quality of function;in 6 weeks  Pt will: be able to perform dressing tasks for his socks, pants, etc with increased ease and pain <2/10; in 6 weeks   Pt will: be able to perform work tasks with increased body mechanics and LBP <2/10; in 6 weeks       Plan / Patient Education:     Continue with initial plan of care.  Progress with home program as tolerated.     Continue with exercises and estim       Subjective:     Pain Ratin  Pt has been inconsistent with his HEP. \" Work has been busy.\"   Pt reports he has been more sore. Pt continues to take the Gabapentin.\"  Pt reports relief from the estim.   \" Nagging pain.\"  Pt continues to have some pain with going from sit to stand.      Objective:     Lumbar ROM:  Flexion: minimal loss finger tips to above ankle  Ext: WNL  SB: WNL B  Rotation: WNL B         Current Exercises:  Exercise #1: supine LTR  Comment #1: review   Exercise #2: SKTC + H/S stretch + nerve glide   Comment #2: Seated Bx30\" + seated Bx5 - alt   Exercise " "#3: Ab set   Comment #3: x5 with 5\" - cues   Exercise #4:  progressed bridge to bridge with knee exetnsion  Comment #4: Bx10 -cues to increase reps   Exercise #5: SHAMEKA + prone press-ups   Comment #5: review   Exercise #6: progressed to dead bug  Comment #6: to fatique  Exercise #7: bird dog  Comment #7: x10      Treatment Today     TREATMENT MINUTES COMMENTS   Evaluation     Self-care/ Home management     Manual therapy     Neuromuscular Re-education     Therapeutic Activity     Therapeutic Exercises 15 See flow sheet   Added to HEP:  -progressed to dead bug  -bridge with march  -bird dog    Objective and subjective measures taken     Gait training     Modality: estim  10  (13) Bilateral lumbar IFC - high/sweep - supine 90/90 + set-up x 3 min               Total 28    Blank areas are intentional and mean the treatment did not include these items.       Stephany Licona, PTA,CLT   11/7/2019  "

## 2021-06-04 NOTE — PROGRESS NOTES
[facility] Discharge Summary    Patient Name: Jose Manuel Santacruz  Date: 12/30/2019  Referring provider: Renetta Alston CNP  Visit Diagnosis:   1. Acute bilateral low back pain without sciatica     2. Lumbar radiculitis     3. Muscle weakness (generalized)         Goals:  Pt. will demonstrate/verbalize independence in self-management of condition in : 6 weeks    Patient will decrease : WALTER score;for improved quality of function;in 6 weeks  Pt will: be able to perform dressing tasks for his socks, pants, etc with increased ease and pain <2/10; in 6 weeks   Pt will: be able to perform work tasks with increased body mechanics and LBP <2/10; in 6 weeks       Patient was seen for 5 visits with the last visit on 11/14/19.   The patient attended therapy initially, but did not finish the therapy sessions prescribed.  Goals were not fully achieved. Explanation for goals not achieved: due to the pt not returning for remaining follow-ups.     Therapy will be discontinued at this time.  The patient will need a new referral to resume.    Thank you for your referral.  Maira Gaona  12/30/2019  2:08 PM

## 2021-06-09 NOTE — PROGRESS NOTES
Subjective:    Jose Manuel Santacruz is seen today for smoking cessation efforts.  Has been smoking around half a pack a day since age 15 or 16.  Patches seem to expensive.  His sister as well as his brother and lower jittery and describes skin burning sensation from patch use.  Would like to use bupropion which has been helpful for other people.  Patient denies mood disorder.  No seizure disorder.  No chronic insomnia issues.  Infertility issues previously documented.  Will they are expecting a daughter through in vitro fertilization sperm donor.  She is expected to be born in April 2017.  Comprehensive review of systems as above otherwise all negative.  Past medical social and family history reviewed and updated as noted below.     - Yael x Sept, 2013 (dated > 5 years)   Wife is expecting on 4/30/17 (expect a girl)  Smoke - 10 cigs/day -> 3-4 per day currently  EtOH: 1-3 x/week will have 1-2   Surgeries: bilateral inguinal hernia repair (one side in 2002 and the other in 2004)   Hospitalizations: skull fracture age 6 after fall from 10-12 foot roof   Mom -   Dad -   1 older sis -   Work:  at [employer]   Hobbies: acrylic paint, ski, landscape, work on house   [place] [facility] [place]    Past Surgical History:   Procedure Laterality Date     HERNIA REPAIR      R and L inguinal        Family History   Problem Relation Age of Onset     No Medical Problems Mother      No Medical Problems Father         History reviewed. No pertinent past medical history.     Social History   Substance Use Topics     Smoking status: Current Every Day Smoker     Packs/day: 0.50     Smokeless tobacco: Never Used     Alcohol use Yes        Current Outpatient Prescriptions   Medication Sig Dispense Refill     ibuprofen 200 mg cap Take 2 capsules by mouth every 6 (six) hours as needed.       MENTHOL (BENGAY TOP) Ultra Strength 4-10-30 % External Cream       buPROPion (WELLBUTRIN SR) 150 MG 12 hr tablet Take 1  tablet (150 mg total) by mouth 2 (two) times a day. 60 tablet 2     No current facility-administered medications for this visit.           Objective:    Vitals:    03/07/17 0855   BP: 100/70   Pulse: 88   Weight: 217 lb (98.4 kg)      Body mass index is 27.12 kg/(m^2).    Alert.  No apparent distress.  Pleasant.  Appropriate eye contact.  No psychomotor agitation.  Forthcoming.      Assessment:    1. Tobacco use disorder  buPROPion (WELLBUTRIN SR) 150 MG 12 hr tablet   2. Immunization counseling           Plan:    25 minutes total time with patient, > 50% with counseling and coordination of cares. Discussed tobacco use disorder.  Trial of bupropion  mg daily ×3 days then twice daily to complete 12 week course.  May use nicotine replacement after 10-14 days from onset of bupropion use at time of smoking cessation.  Would use Chantix if bupropion ineffective.  Did review immunization counseling as well with upcoming daughter anticipated April 2017.  Patient will receive flu shot through work otherwise immunizations up-to-date.

## 2021-06-17 NOTE — PATIENT INSTRUCTIONS - HE
Patient Instructions by Stephany Licona PTA at 11/14/2019  7:00 AM     Author: Stephany Licona PTA Service: -- Author Type: Physical Therapist Assistant    Filed: 11/14/2019  7:22 AM Encounter Date: 11/14/2019 Status: Signed    : Stephany Licona PTA (Physical Therapist Assistant)        PLANK    While lying face down, lift your body up on your elbows and toes. Try and maintain a straight spine. Do not allow your hips or pelvis on either side to drop.     You may modify by planking on your knees      LATERAL PLANK    While lying on your side with your knees bent, lift your body up on your elbow. Try and maintain a straight spine.    You may modify be bending your knees.    PLANK LATERAL WITH HIP ABDUCTION    While lying on your side, lift your body up on your elbow and feet. Next, slowly raise up the top most leg upwards, then return. Try and maintain a straight spine the entire time.

## 2021-06-19 NOTE — LETTER
Letter by Renetta Alston CNP at      Author: Renetta Alston CNP Service: -- Author Type: --    Filed:  Encounter Date: 9/25/2019 Status: Signed         September 25, 2019     Patient: Jose Manuel Santacruz   YOB: 1985   Date of Visit: 9/25/2019       To Whom it May Concern:    Jose Manuel Santacruz was seen in my clinic on 9/25/2019. Due to injury, he should avoid lifting greater than 10 pounds and pushing/pulling greater than 30 pounds until further notice. He should be excused form work on 9/25/2019.     If you have any questions or concerns, please don't hesitate to call.    Sincerely,         Electronically signed by Renetta Alston CNP

## 2021-06-19 NOTE — LETTER
Letter by Heavenly Barrett CNP at      Author: Heavenly Barrett CNP Service: -- Author Type: --    Filed:  Date of Service:  Status: Signed       September 26, 2019     Patient: Jose Manuel Santacruz   YOB: 1985   Date of Visit: 9/26/2019       To Whom It May Concern:    It is my medical opinion that Jose Manuel Santacruz may return to light duty immediately with the following restrictions: 10 pound lifting limit, limiting bending and twisting as tolerated.  Restrictions through 10/19/2019, will revisit further workability at that time as needed.    If you have any questions or concerns, please don't hesitate to call.    Sincerely,        Heavenly Barrett CNP

## 2021-06-26 ENCOUNTER — HEALTH MAINTENANCE LETTER (OUTPATIENT)
Age: 36
End: 2021-06-26

## 2021-10-13 ENCOUNTER — E-VISIT (OUTPATIENT)
Dept: URGENT CARE | Facility: CLINIC | Age: 36
End: 2021-10-13
Payer: COMMERCIAL

## 2021-10-13 DIAGNOSIS — Z20.822 SUSPECTED COVID-19 VIRUS INFECTION: Primary | ICD-10-CM

## 2021-10-13 PROCEDURE — 99421 OL DIG E/M SVC 5-10 MIN: CPT | Performed by: FAMILY MEDICINE

## 2021-10-14 ENCOUNTER — LAB (OUTPATIENT)
Dept: FAMILY MEDICINE | Facility: CLINIC | Age: 36
End: 2021-10-14
Attending: FAMILY MEDICINE
Payer: COMMERCIAL

## 2021-10-14 DIAGNOSIS — Z20.822 SUSPECTED COVID-19 VIRUS INFECTION: ICD-10-CM

## 2021-10-14 PROCEDURE — U0005 INFEC AGEN DETEC AMPLI PROBE: HCPCS

## 2021-10-14 PROCEDURE — U0003 INFECTIOUS AGENT DETECTION BY NUCLEIC ACID (DNA OR RNA); SEVERE ACUTE RESPIRATORY SYNDROME CORONAVIRUS 2 (SARS-COV-2) (CORONAVIRUS DISEASE [COVID-19]), AMPLIFIED PROBE TECHNIQUE, MAKING USE OF HIGH THROUGHPUT TECHNOLOGIES AS DESCRIBED BY CMS-2020-01-R: HCPCS

## 2021-10-14 NOTE — PATIENT INSTRUCTIONS
Dear Jose Manuel Santacruz,    Your symptoms show that you may have coronavirus (COVID-19). This illness can cause fever, cough and trouble breathing. Many people get a mild case and get better on their own. Some people can get very sick.    Will I be tested for COVID-19?  We would like to test you for Covid-19 virus. I have placed orders for this test.     To schedule: go to your [facility] home page and scroll down to the section that says  You have an appointment that needs to be scheduled  and click the large green button that says  Schedule Now  and follow the steps to find the next available openings.    If you are unable to complete these [facility] scheduling steps, please call [phone] to schedule your testing.     Return to work/school/ guidance:  Please let your workplace manager and staffing office know when your quarantine ends     We can t give you an exact date as it depends on the above. You can calculate this on your own or work with your manager/staffing office to calculate this. (For example if you were exposed on 10/4, you would have to quarantine for 14 full days. That would be through 10/18. You could return on 10/19.)      If you receive a positive COVID-19 test result, follow the guidance of the those who are giving you the results. Usually the return to work is 10 (or in some cases 20 days from symptom onset.) If you work at [facility], you must also be cleared by Employee Occupational Health and Safety to return to work.        If you receive a negative COVID-19 test result and did not have a high risk exposure to someone with a known positive COVID-19 test, you can return to work once you're free of fever for 24 hours without fever-reducing medication and your symptoms are improving or resolved.      If you receive a negative COVID-19 test and If you had a high risk exposure to someone who has tested positive for COVID-19 then you can return to work 14 days after your last contact  with the positive individual    Note: If you have ongoing exposure to the covid positive person, this quarantine period may be more than 14 days. (For example, if you are continued to be exposed to your child who tested positive and cannot isolate from them, then the quarantine of 7-14 days can't start until your child is no longer contagious. This is typically 10 days from onset of the child's symptoms. So the total duration may be 17-24 days in this case.)    Sign up for [employer].   We know it's scary to hear that you might have COVID-19. We want to track your symptoms to make sure you're okay over the next 2 weeks. Please look for an email from [employer]--this is a free, online program that we'll use to keep in touch. To sign up, follow the link in the email you will receive. Learn more at [email or web address]    How can I take care of myself?    Get lots of rest. Drink extra fluids (unless a doctor has told you not to)    Take Tylenol (acetaminophen) or ibuprofen for fever or pain. If you have liver or kidney problems, ask your family doctor if it's okay to take Tylenol o ibuprofen    If you have other health problems (like cancer, heart failure, an organ transplant or severe kidney disease): Call your specialty clinic if you don't feel better in the next 2 days.    Know when to call 911. Emergency warning signs include:  o Trouble breathing or shortness of breath  o Pain or pressure in the chest that doesn't go away  o Feeling confused like you haven't felt before, or not being able to wake up  o Bluish-colored lips or face    Where can I get more information?  [facility] - About COVID-19:   [email or web address]    CDC - What to Do If You're Sick:   www.cdc.gov/coronavirus/2019-ncov/about/steps-when-sick.html    October 13, 2021  RE:  Jose Manuel Santacruz                                                                                                                  [address]  [place] [address]      To whom it may concern:    I evaluated Jose Manuel Santacruz on October 13, 2021. Jose Manuel Santacruz should be excused from work/school.     They should let their workplace manager and staffing office know when their quarantine ends.    We can not give an exact date as it depends on the information below. They can calculate this on their own or work with their manager/staffing office to calculate this. (For example if they were exposed on 10/04, they would have to quarantine for 14 full days. That would be through 10/18. They could return on 10/19.)    Quarantine Guidelines:      If patient receives a positive COVID-19 test result, they should follow the guidance of those who are giving the results. Usually the return to work is 10 (or in some cases 20 days from symptom onset.) If they work at [email or web address], they must be cleared by Employee Occupational Health and Safety to return to work.        If patient receives a negative COVID-19 test result and did not have a high risk exposure to someone with a known positive COVID-19 test, they can return to work once they're free of fever for 24 hours without fever-reducing medication and their symptoms are improving or resolved.      If patient receives a negative COVID-19 test and if they had a high risk exposure to someone who has tested positive for COVID-19 then they can return to work 14 days after their last contact with the positive individual    Note: If there is ongoing exposure to the covid positive person, this quarantine period may be longer than 14 days. (For example, if they are continually exposed to their child, who tested positive and cannot isolate from them, then the quarantine of 7-14 days can't start until their child is no longer contagious. This is typically 10 days from onset to the child's symptoms. So the total duration may be 17-24 days in this case.)     Sincerely,  Mikhail Lewis MD

## 2021-10-16 ENCOUNTER — HEALTH MAINTENANCE LETTER (OUTPATIENT)
Age: 36
End: 2021-10-16

## 2021-10-16 LAB — SARS-COV-2 RNA RESP QL NAA+PROBE: POSITIVE

## 2022-07-23 ENCOUNTER — HEALTH MAINTENANCE LETTER (OUTPATIENT)
Age: 37
End: 2022-07-23

## 2022-10-01 ENCOUNTER — HEALTH MAINTENANCE LETTER (OUTPATIENT)
Age: 37
End: 2022-10-01

## 2022-11-19 ENCOUNTER — E-VISIT (OUTPATIENT)
Dept: URGENT CARE | Facility: CLINIC | Age: 37
End: 2022-11-19
Payer: COMMERCIAL

## 2022-11-19 DIAGNOSIS — H10.33 ACUTE BACTERIAL CONJUNCTIVITIS OF BOTH EYES: Primary | ICD-10-CM

## 2022-11-19 PROCEDURE — 99421 OL DIG E/M SVC 5-10 MIN: CPT | Performed by: PHYSICIAN ASSISTANT

## 2022-11-19 RX ORDER — CIPROFLOXACIN HYDROCHLORIDE 3.5 MG/ML
1-2 SOLUTION/ DROPS TOPICAL EVERY 4 HOURS
Qty: 5 ML | Refills: 0 | Status: SHIPPED | OUTPATIENT
Start: 2022-11-19 | End: 2022-11-26

## 2022-11-19 NOTE — PATIENT INSTRUCTIONS
Thank you for choosing us for your care. I have placed an order for a prescription so that you can start treatment. View your full visit summary for details by clicking on the link below. Your pharmacist will able to address any questions you may have about the medication.     If you re not feeling better within 2-3 days, please schedule an appointment.  You can schedule an appointment right here in [facility], or call [phone]  If the visit is for the same symptoms as your eVisit, we ll refund the cost of your eVisit if seen within seven days.      Bacterial Conjunctivitis    You have an infection in the membranes covering the white part of the eye. This part of the eye is called the conjunctiva. The infection is called conjunctivitis. The most common symptoms of conjunctivitis include a thick, pus-like discharge from the eye, swollen eyelids, redness, eyelids sticking together upon awakening, and a gritty or scratchy feeling in the eye. Your infection was caused by bacteria. It may be treated with medicine. With treatment, the infection takes about 7 to 10 days to resolve.   Home care    Use prescribed antibiotic eye drops or ointment as directed to treat the infection.    Apply a warm compress (towel soaked in warm water) to the affected eye 3 to 4 times a day. Do this just before applying medicine to the eye.    Use a warm, wet cloth to wipe away crusting of the eyelids in the morning. This is caused by mucus drainage during the night. You may also use saline irrigating solution or artificial tears to rinse away mucus in the eye. Do not put a patch over the eye.    Wash your hands before and after touching the infected eye. This is to prevent spreading the infection to the other eye, and to other people. Don't share your towels or washcloths with others.    You may use acetaminophen or ibuprofen to control pain, unless another medicine was prescribed. Talk with your healthcare provider before using these  medicines if you have chronic liver or kidney disease. Also talk with your provider if you have ever had a stomach ulcer or digestive bleeding.    Don't wear contact lenses until your eyes have healed and all symptoms are gone.    Follow-up care  Follow up with your healthcare provider, or as advised.  When to seek medical advice  Call your healthcare provider right away if any of these occur:    Worsening vision    Increasing pain in the eye    Increasing swelling or redness of the eyelid    Redness spreading around the eye  [employer] last reviewed this educational content on 4/1/2020 2000-2021 [employer]. All rights reserved. This information is not intended as a substitute for professional medical care. Always follow your healthcare professional's instructions.

## 2023-02-21 ENCOUNTER — OFFICE VISIT (OUTPATIENT)
Dept: FAMILY MEDICINE | Facility: CLINIC | Age: 38
End: 2023-02-21
Payer: COMMERCIAL

## 2023-02-21 VITALS
DIASTOLIC BLOOD PRESSURE: 72 MMHG | TEMPERATURE: 98 F | HEART RATE: 73 BPM | HEIGHT: 74 IN | SYSTOLIC BLOOD PRESSURE: 118 MMHG | BODY MASS INDEX: 27.95 KG/M2 | OXYGEN SATURATION: 98 % | WEIGHT: 217.8 LBS

## 2023-02-21 DIAGNOSIS — Z11.59 NEED FOR HEPATITIS C SCREENING TEST: ICD-10-CM

## 2023-02-21 DIAGNOSIS — Z13.220 SCREENING FOR HYPERLIPIDEMIA: ICD-10-CM

## 2023-02-21 DIAGNOSIS — Z11.4 ENCOUNTER FOR SCREENING FOR HIV: ICD-10-CM

## 2023-02-21 DIAGNOSIS — Z00.00 ANNUAL PHYSICAL EXAM: Primary | ICD-10-CM

## 2023-02-21 LAB
CHOLEST SERPL-MCNC: 162 MG/DL
FASTING STATUS PATIENT QL REPORTED: NO
GLUCOSE SERPL-MCNC: 87 MG/DL (ref 70–99)
HDLC SERPL-MCNC: 40 MG/DL
LDLC SERPL CALC-MCNC: 105 MG/DL
NONHDLC SERPL-MCNC: 122 MG/DL
TRIGL SERPL-MCNC: 85 MG/DL

## 2023-02-21 PROCEDURE — 99385 PREV VISIT NEW AGE 18-39: CPT | Performed by: FAMILY MEDICINE

## 2023-02-21 PROCEDURE — 87389 HIV-1 AG W/HIV-1&-2 AB AG IA: CPT | Performed by: FAMILY MEDICINE

## 2023-02-21 PROCEDURE — 86803 HEPATITIS C AB TEST: CPT | Performed by: FAMILY MEDICINE

## 2023-02-21 PROCEDURE — 80061 LIPID PANEL: CPT | Performed by: FAMILY MEDICINE

## 2023-02-21 PROCEDURE — 36415 COLL VENOUS BLD VENIPUNCTURE: CPT | Performed by: FAMILY MEDICINE

## 2023-02-21 PROCEDURE — 82947 ASSAY GLUCOSE BLOOD QUANT: CPT | Performed by: FAMILY MEDICINE

## 2023-02-21 ASSESSMENT — ENCOUNTER SYMPTOMS
HEADACHES: 0
ABDOMINAL PAIN: 0
DIZZINESS: 0
SORE THROAT: 0
COUGH: 0
HEARTBURN: 0
CHILLS: 0
HEMATURIA: 0
WEAKNESS: 0
NERVOUS/ANXIOUS: 0
FREQUENCY: 1
HEMATOCHEZIA: 0
MYALGIAS: 1
PALPITATIONS: 0
NAUSEA: 0
DYSURIA: 0
JOINT SWELLING: 0
DIARRHEA: 0
SHORTNESS OF BREATH: 0
FEVER: 0
PARESTHESIAS: 0
CONSTIPATION: 0
EYE PAIN: 0
ARTHRALGIAS: 1

## 2023-02-21 ASSESSMENT — PAIN SCALES - GENERAL: PAINLEVEL: NO PAIN (0)

## 2023-02-22 LAB
HCV AB SERPL QL IA: NONREACTIVE
HIV 1+2 AB+HIV1 P24 AG SERPL QL IA: NONREACTIVE

## 2023-05-09 ENCOUNTER — OFFICE VISIT (OUTPATIENT)
Dept: DERMATOLOGY | Facility: CLINIC | Age: 38
End: 2023-05-09
Payer: COMMERCIAL

## 2023-05-09 DIAGNOSIS — L72.11 PILAR CYST: ICD-10-CM

## 2023-05-09 DIAGNOSIS — D23.9 DERMAL NEVUS: ICD-10-CM

## 2023-05-09 DIAGNOSIS — D18.01 ANGIOMA OF SKIN: ICD-10-CM

## 2023-05-09 DIAGNOSIS — L64.9 ANDROGENETIC ALOPECIA: Primary | ICD-10-CM

## 2023-05-09 PROCEDURE — 99203 OFFICE O/P NEW LOW 30 MIN: CPT | Performed by: DERMATOLOGY

## 2023-05-09 ASSESSMENT — PAIN SCALES - GENERAL: PAINLEVEL: NO PAIN (0)

## 2023-05-09 NOTE — LETTER
2023         RE: Jose Manuel Santacruz  [address] [address]        Dear Colleague,    Thank you for referring your patient, Jose Manuel Santacruz, to the [facility]. Please see a copy of my visit note below.    Jose Manuel Santacruz is an extremely pleasant 38 year old year old male patient here today for hair loss and spot on scalp.   .   Patient states this has been present for a while.  Patient reports the following symptoms:  bump.  Patient reports the following previous treatments none.  These treatments did not work.  Patient reports the following modifying factors none.  Associated symptoms: none.  Patient has no other skin complaints today.  Remainder of the HPI, Meds, PMH, Allergies, FH, and SH was reviewed in chart.    No past medical history on file.    Past Surgical History:   Procedure Laterality Date     HERNIA REPAIR      R and L inguinal        Family History   Problem Relation Age of Onset     No Known Problems Mother      No Known Problems Father        Social History     Socioeconomic History     Marital status:      Spouse name: Not on file     Number of children: Not on file     Years of education: Not on file     Highest education level: Not on file   Occupational History     Not on file   Tobacco Use     Smoking status: Former     Packs/day: 0.00     Types: Cigarettes     Quit date: 2017     Years since quittin.3     Smokeless tobacco: Never   Vaping Use     Vaping status: Not on file   Substance and Sexual Activity     Alcohol use: Yes     Drug use: No     Sexual activity: Not on file   Other Topics Concern     Not on file   Social History Narrative     Not on file     Social Determinants of Health     Financial Resource Strain: Not on file   Food Insecurity: Not on file   Transportation Needs: Not on file   Physical Activity: Not on file   Stress: Not on file   Social Connections: Not on file   Intimate Partner Violence: Not on file   Housing  Stability: Not on file       Outpatient Encounter Medications as of 5/9/2023   Medication Sig Dispense Refill     ibuprofen 200 mg cap [IBUPROFEN 200 MG CAP] Take 2 capsules by mouth every 6 (six) hours as needed.       MEDICATION CANNOT BE REORDERED - PLEASE MANUALLY REORDER AND DISCONTINUE THE OLD ORDER [MENTHOL (BENGAY TOP)] Ultra Strength 4-10-30 % External Cream       No facility-administered encounter medications on file as of 5/9/2023.             O:   NAD, WDWN, Alert & Oriented, Mood & Affect wnl, Vitals stable   Here today alone   General appearance normal   Vitals stable   Alert, oriented and in no acute distress     Frontal thinning  Flesh colored papules, red papules in scalp  Nodule in scalp      Eyes: Conjunctivae/lids:Normal     ENT: Lips, buccal mucosa, tongue: normal    MSK:Normal    Cardiovascular: peripheral edema none    Pulm: Breathing Normal    Neuro/Psych: Orientation:Alert and Orientedx3 ; Mood/Affect:normal       A/P:  1. angioma, dermal nevus  2. Pilar cyst  Excision discussed with patient   3. Androgenetic alopecia  Pathophysiology discussed with pateint   Rogaine, prp and orals discussed with patient   He will try rogaine  Return to clinic 3 months    it was a pleasure speaking to Jose Manuel Santacruz today.  Previous clinic notes and pertinent laboratory tests were reviewed prior to Jose Manuel Santacruz's visit.  Nature of benign skin lesions dicussed with patient.        Again, thank you for allowing me to participate in the care of your patient.        Sincerely,        Hans Cottrell MD

## 2023-05-09 NOTE — PROGRESS NOTES
Jose Manuel Santacruz is an extremely pleasant 38 year old year old male patient here today for hair loss and spot on scalp.   .   Patient states this has been present for a while.  Patient reports the following symptoms:  bump.  Patient reports the following previous treatments none.  These treatments did not work.  Patient reports the following modifying factors none.  Associated symptoms: none.  Patient has no other skin complaints today.  Remainder of the HPI, Meds, PMH, Allergies, FH, and SH was reviewed in chart.    No past medical history on file.    Past Surgical History:   Procedure Laterality Date     HERNIA REPAIR      R and L inguinal        Family History   Problem Relation Age of Onset     No Known Problems Mother      No Known Problems Father        Social History     Socioeconomic History     Marital status:      Spouse name: Not on file     Number of children: Not on file     Years of education: Not on file     Highest education level: Not on file   Occupational History     Not on file   Tobacco Use     Smoking status: Former     Packs/day: 0.00     Types: Cigarettes     Quit date: 2017     Years since quittin.3     Smokeless tobacco: Never   Vaping Use     Vaping status: Not on file   Substance and Sexual Activity     Alcohol use: Yes     Drug use: No     Sexual activity: Not on file   Other Topics Concern     Not on file   Social History Narrative     Not on file     Social Determinants of Health     Financial Resource Strain: Not on file   Food Insecurity: Not on file   Transportation Needs: Not on file   Physical Activity: Not on file   Stress: Not on file   Social Connections: Not on file   Intimate Partner Violence: Not on file   Housing Stability: Not on file       Outpatient Encounter Medications as of 2023   Medication Sig Dispense Refill     ibuprofen 200 mg cap [IBUPROFEN 200 MG CAP] Take 2 capsules by mouth every 6 (six) hours as needed.       MEDICATION CANNOT BE REORDERED -  PLEASE MANUALLY REORDER AND DISCONTINUE THE OLD ORDER [MENTHOL (BENGAY TOP)] Ultra Strength 4-10-30 % External Cream       No facility-administered encounter medications on file as of 5/9/2023.             O:   NAD, WDWN, Alert & Oriented, Mood & Affect wnl, Vitals stable   Here today alone   General appearance normal   Vitals stable   Alert, oriented and in no acute distress     Frontal thinning  Flesh colored papules, red papules in scalp  Nodule in scalp      Eyes: Conjunctivae/lids:Normal     ENT: Lips, buccal mucosa, tongue: normal    MSK:Normal    Cardiovascular: peripheral edema none    Pulm: Breathing Normal    Neuro/Psych: Orientation:Alert and Orientedx3 ; Mood/Affect:normal       A/P:  1. angioma, dermal nevus  2. Pilar cyst  Excision discussed with patient   3. Androgenetic alopecia  Pathophysiology discussed with pateint   Rogaine, prp and orals discussed with patient   He will try rogaine  Return to clinic 3 months    it was a pleasure speaking to Jose Manuel Santacruz today.  Previous clinic notes and pertinent laboratory tests were reviewed prior to Jose Manuel Santacruz's visit.  Nature of benign skin lesions dicussed with patient.

## 2023-09-25 NOTE — PROGRESS NOTES
"Jose Manuel Santacruz  /72 (BP Location: Left arm, Patient Position: Sitting, Cuff Size: Adult Regular)   Pulse 73   Temp 98  F (36.7  C) (Temporal)   Ht 1.888 m (6' 2.33\")   Wt 98.8 kg (217 lb 12.8 oz)   SpO2 98%   BMI 27.72 kg/m       Assessment/Plan:                Jose Manuel was seen today for physical.    Diagnoses and all orders for this visit:    Annual physical exam  -     Lipid panel reflex to direct LDL Fasting  -     Glucose    Screening for hyperlipidemia    Encounter for screening for HIV  -     HIV Antigen Antibody Combo    Need for hepatitis C screening test  -     Hepatitis C Screen Reflex to HCV RNA Quant and Genotype         DISCUSSION  Obtain labs as above.  See additional discussion below.  Work to maintain active healthy lifestyle.  Subjective:     HPI:    Jose Manuel Santacruz is a 37 year old male is here today for physical.  He is generally healthy.  Body mass index slightly over ideal.  Blood pressure has been ideal.  Previous cholesterol measurements have been ideal.  He works as an , he is active at his job.  He walks his dog every day in addition.    Family history significant for father with prostate cancer diagnosed at approximately age 69 treated with believed to be surgery.  Grandmother with colon cancer.  Aunt with breast cancer.  No family history of genetic testing for cancer syndromes.  Patient inquires about cancer screening.  Based on the considerations we discussed in some detail do not feel it is necessary to initiate screening with prostate cancer or colon cancer testing.  We acknowledge the development of other cancer screening tests but did not feel it was necessary to proceed at this time.    We discussed immunizations.    We discussed reevaluating cholesterol and blood sugar.  We discussed routine screening for HIV and hepatitis C, he is agreeable.    He has a benign mole on the anterior scalp.    ROS:  Complete review of systems is obtained.  Other than the specific " Stable on current meds.   considerations noted above complete review of systems is negative.    Objective:   Medications:  Current Outpatient Medications   Medication     ibuprofen 200 mg cap     MEDICATION CANNOT BE REORDERED - PLEASE MANUALLY REORDER AND DISCONTINUE THE OLD ORDER     No current facility-administered medications for this visit.        Allergies:     Allergies   Allergen Reactions     Penicillins Unknown        Social History     Socioeconomic History     Marital status:      Spouse name: Not on file     Number of children: Not on file     Years of education: Not on file     Highest education level: Not on file   Occupational History     Not on file   Tobacco Use     Smoking status: Former     Packs/day: 0.00     Types: Cigarettes     Quit date: 2017     Years since quittin.1     Smokeless tobacco: Never   Substance and Sexual Activity     Alcohol use: Yes     Drug use: No     Sexual activity: Not on file   Other Topics Concern     Not on file   Social History Narrative     Not on file     Social Determinants of Health     Financial Resource Strain: Not on file   Food Insecurity: Not on file   Transportation Needs: Not on file   Physical Activity: Not on file   Stress: Not on file   Social Connections: Not on file   Intimate Partner Violence: Not on file   Housing Stability: Not on file       Family History   Problem Relation Age of Onset     No Known Problems Mother      No Known Problems Father         Most Recent Immunizations   Administered Date(s) Administered     COVID-19 Vaccine 12+ (Pfizer) 2021     Influenza (IIV3) PF 10/04/2011     Influenza Vaccine >6 months (Alfuria,Fluzone) 2014     Tdap (Adacel,Boostrix) 2014        Wt Readings from Last 3 Encounters:   23 98.8 kg (217 lb 12.8 oz)   19 101.3 kg (223 lb 4.8 oz)   17 98.4 kg (217 lb)        BP Readings from Last 6 Encounters:   23 118/72   19 120/80        Hemoglobin A1C   Date Value Ref Range Status  "  01/23/2014 5.0 3.5 - 6.0 % Final      PHYSICAL EXAM:    /72 (BP Location: Left arm, Patient Position: Sitting, Cuff Size: Adult Regular)   Pulse 73   Temp 98  F (36.7  C) (Temporal)   Ht 1.888 m (6' 2.33\")   Wt 98.8 kg (217 lb 12.8 oz)   SpO2 98%   BMI 27.72 kg/m           General Appearance:    Alert, cooperative, no distress   Eyes:   No scleral icterus or conjunctival irritation       Ears:    Normal TM's and external ear canals, both ears   Throat:   Lips, mucosa, and tongue normal; teeth and gums normal   Neck:   Supple, symmetrical, trachea midline, no adenopathy;        thyroid:  No enlargement/tenderness/nodules   Lungs:     Clear to auscultation bilaterally, respirations unlabored, no wheezes or crackles   Heart:    Regular rate and rhythm,  No murmur   Abdomen:    Soft, no distention, no tenderness on palpation, no masses, no organomegaly     Extremities:  No edema, no joint swelling or redness, no evidence of any injuries   Skin:  No concerning skin findings, no suspicious moles, no rashes, benign appearing skin colored mole that may be a seborrheic keratosis or other benign mole on the anterior scalp.  A few pigmented moles noted on the back but nothing standing out is a significant concern.   Neurologic:  On gross examination there is no motor or sensory deficit.  Patient walks with a normal gait       Answers for HPI/ROS submitted by the patient on 2/21/2023  Frequency of exercise:: 6-7 days/week  Getting at least 3 servings of Calcium per day:: Yes  Diet:: Regular (no restrictions)  Taking medications regularly:: Yes  Bi-annual eye exam:: NO  Dental care twice a year:: Yes  Sleep apnea or symptoms of sleep apnea:: None  abdominal pain: No  Blood in stool: No  Blood in urine: No  chest pain: No  chills: No  congestion: No  constipation: No  cough: No  diarrhea: No  dizziness: No  ear pain: No  eye pain: No  nervous/anxious: No  fever: No  frequency: Yes  genital sores: No  headaches: " No  hearing loss: No  heartburn: No  arthralgias: Yes  joint swelling: No  peripheral edema: No  mood changes: No  myalgias: Yes  nausea: No  dysuria: No  palpitations: No  Skin sensation changes: No  sore throat: No  urgency: No  rash: No  shortness of breath: No  visual disturbance: No  weakness: No  impotence: No  penile discharge: No  Additional concerns today:: Yes  Duration of exercise:: 15-30 minutes

## 2024-02-14 ENCOUNTER — PATIENT OUTREACH (OUTPATIENT)
Dept: CARE COORDINATION | Facility: CLINIC | Age: 39
End: 2024-02-14
Payer: COMMERCIAL

## 2024-02-28 ENCOUNTER — PATIENT OUTREACH (OUTPATIENT)
Dept: CARE COORDINATION | Facility: CLINIC | Age: 39
End: 2024-02-28
Payer: COMMERCIAL

## 2024-05-12 ENCOUNTER — HEALTH MAINTENANCE LETTER (OUTPATIENT)
Age: 39
End: 2024-05-12

## 2025-02-24 ENCOUNTER — OFFICE VISIT (OUTPATIENT)
Dept: URGENT CARE | Facility: URGENT CARE | Age: 40
End: 2025-02-24
Payer: COMMERCIAL

## 2025-02-24 VITALS
TEMPERATURE: 98.5 F | HEART RATE: 79 BPM | RESPIRATION RATE: 16 BRPM | SYSTOLIC BLOOD PRESSURE: 125 MMHG | DIASTOLIC BLOOD PRESSURE: 75 MMHG | OXYGEN SATURATION: 97 %

## 2025-02-24 DIAGNOSIS — J01.00 ACUTE NON-RECURRENT MAXILLARY SINUSITIS: Primary | ICD-10-CM

## 2025-02-24 PROCEDURE — 99213 OFFICE O/P EST LOW 20 MIN: CPT | Performed by: PHYSICIAN ASSISTANT

## 2025-02-24 RX ORDER — CEFDINIR 300 MG/1
300 CAPSULE ORAL 2 TIMES DAILY
Qty: 14 CAPSULE | Refills: 0 | Status: SHIPPED | OUTPATIENT
Start: 2025-02-24 | End: 2025-03-03

## 2025-02-24 NOTE — PROGRESS NOTES
Chief Complaint   Patient presents with    Sinus Problem     Sinus and chest congestion for about a week       Assessment & Plan  Acute non-recurrent maxillary sinusitis:  - Sinusitis with possible bronchitis. Symptoms peaked over the weekend and are now improving. Lungs are clear, heart sounds normal, throat normal, and nasal passages show erythema.  -Since he is about to go out of town it is reasonable prescribe antibiotics but advise not to take them immediately. Monitor symptoms over the next 3-4 days. If symptoms worsen, indicating a bacterial infection, then start antibiotics. Use Tylenol or ibuprofen for symptom relief. Mucinex recommended to clear nasal congestion.     ICD-10-CM    1. Acute non-recurrent maxillary sinusitis  J01.00 cefdinir (OMNICEF) 300 MG capsule          SUBJECTIVE:  History of Present Illness-Jose Manuel Santacruz, a 39-year-old male, reports experiencing congestion in the sinuses and lungs for 1 week.  The symptoms worsened over the past weekend, prompting him to seek medical attention due to an upcoming trip at the end of the week. He noted feeling slightly better in the morning but experienced a worsening of symptoms upon starting his workday. Jose Manuel expressed concern about the potential impact of these symptoms on his upcoming plane ride on Friday. He plans to travel to [place].     ROS: Pertinent ROS neg other than the symptoms noted above in the HPI.     OBJECTIVE:  /75   Pulse 79   Temp 98.5  F (36.9  C) (Oral)   Resp 16   SpO2 97%    Physical Exam  - HEENT: Nasal passages show erythema; throat appears normal.  - CARDIOVASCULAR: Heart sounds are normal with regular rate and rhythm.  - LUNGS: Lungs are clear to auscultation bilaterally.       DIAGNOSTICS       No results found for any visits on 02/24/25.     Luke Galvan PA-C    Consent was obtained from the patient to use an AI documentation tool in the creation of this note.  Any grammatical or spelling errors are  non-intentional. Please contact the author of this note directly if you are in need of any clarification.     Current Outpatient Medications   Medication Sig Dispense Refill    ibuprofen 200 mg cap [IBUPROFEN 200 MG CAP] Take 2 capsules by mouth every 6 (six) hours as needed.      cefdinir (OMNICEF) 300 MG capsule Take 1 capsule (300 mg) by mouth 2 times daily for 7 days. 14 capsule 0    MEDICATION CANNOT BE REORDERED - PLEASE MANUALLY REORDER AND DISCONTINUE THE OLD ORDER [MENTHOL (BENGAY TOP)] Ultra Strength 4-10-30 % External Cream (Patient not taking: Reported on 2025)       No current facility-administered medications for this visit.      Patient Active Problem List   Diagnosis    Joint Pain, Localized In The Right Shoulder    Infertility      No past medical history on file.  Past Surgical History:   Procedure Laterality Date    HERNIA REPAIR      R and L inguinal     Family History   Problem Relation Age of Onset    No Known Problems Mother     No Known Problems Father      Social History     Tobacco Use    Smoking status: Former     Current packs/day: 0.00     Types: Cigarettes     Quit date: 2017     Years since quittin.1    Smokeless tobacco: Never   Substance Use Topics    Alcohol use: Yes

## 2025-05-18 ENCOUNTER — HEALTH MAINTENANCE LETTER (OUTPATIENT)
Age: 40
End: 2025-05-18